# Patient Record
Sex: FEMALE | Race: WHITE | URBAN - METROPOLITAN AREA
[De-identification: names, ages, dates, MRNs, and addresses within clinical notes are randomized per-mention and may not be internally consistent; named-entity substitution may affect disease eponyms.]

---

## 2024-09-10 ENCOUNTER — OFFICE VISIT (OUTPATIENT)
Dept: URGENT CARE | Age: 44
End: 2024-09-10
Payer: COMMERCIAL

## 2024-09-10 VITALS
HEART RATE: 71 BPM | SYSTOLIC BLOOD PRESSURE: 126 MMHG | BODY MASS INDEX: 25.13 KG/M2 | RESPIRATION RATE: 17 BRPM | WEIGHT: 128 LBS | OXYGEN SATURATION: 98 % | DIASTOLIC BLOOD PRESSURE: 84 MMHG | HEIGHT: 60 IN | TEMPERATURE: 98.1 F

## 2024-09-10 DIAGNOSIS — J20.9 ACUTE BRONCHITIS, UNSPECIFIED ORGANISM: Primary | ICD-10-CM

## 2024-09-10 PROBLEM — G35 MULTIPLE SCLEROSIS (MULTI): Status: ACTIVE | Noted: 2024-09-10

## 2024-09-10 RX ORDER — PREDNISONE 20 MG/1
40 TABLET ORAL DAILY
Qty: 10 TABLET | Refills: 0 | Status: SHIPPED | OUTPATIENT
Start: 2024-09-10 | End: 2024-09-15

## 2024-09-10 RX ORDER — ALBUTEROL SULFATE 90 UG/1
2 INHALANT RESPIRATORY (INHALATION) EVERY 6 HOURS PRN
Qty: 18 G | Refills: 0 | Status: SHIPPED | OUTPATIENT
Start: 2024-09-10 | End: 2025-09-10

## 2024-09-10 RX ORDER — CLONAZEPAM 0.5 MG/1
0.5 TABLET ORAL AS NEEDED
COMMUNITY

## 2024-09-10 NOTE — PATIENT INSTRUCTIONS
Please follow up with your primary provider within one week if symptoms do not improve.  You may schedule an appointment online at [email or web address] or call [phone]. Go to the Emergency Department if symptoms significantly worsen or if you develop chest pain or shortness of breath.    Your chest xray did not show signs of bacterial infection. Will call if final report shows otherwise.

## 2024-09-10 NOTE — PROGRESS NOTES
Subjective   Patient ID: Monika Rodriguez is a 43 y.o. female. They present today with a chief complaint of URI (URI symptoms & ear pain for 1 week. No fevers.).    History of Present Illness  Reports predominately dry cough, chest congestion, mild SOB worsening x1 week. Had been on Ocrevis for MS which caused increased URI symptoms. States she would take Augmentin, prednisone, and an inhaler for these symptoms in the past (most recently 3 months ago) with improvement. States she got those medications every other month in the winter. Denies CP, fever, runny nose, rash, leg swelling.       URI      Past Medical History  Allergies as of 09/10/2024 - Reviewed 09/10/2024   Allergen Reaction Noted    Nitrofurantoin monohyd/m-cryst Rash and Shortness of breath 02/28/2017    Polyurethane-39 Shortness of breath 01/02/2024       (Not in a hospital admission)       No past medical history on file.    No past surgical history on file.         Review of Systems  Pertinent systems reviewed and were negative unless otherwise stated in HPI.    Objective    Vitals:    09/10/24 1646   BP: 126/84   BP Location: Left arm   Patient Position: Sitting   BP Cuff Size: Small adult   Pulse: 71   Resp: 17   Temp: 36.7 °C (98.1 °F)   TempSrc: Oral   SpO2: 98%   Weight: 58.1 kg (128 lb)   Height: 1.524 m (5')     No LMP recorded (lmp unknown).    Physical Exam  Constitutional:       General: She is not in acute distress.  HENT:      Right Ear: Tympanic membrane and ear canal normal.      Left Ear: Tympanic membrane and ear canal normal.      Mouth/Throat:      Mouth: Mucous membranes are moist.      Pharynx: Oropharynx is clear.   Eyes:      Conjunctiva/sclera: Conjunctivae normal.   Cardiovascular:      Rate and Rhythm: Normal rate and regular rhythm.   Pulmonary:      Effort: Pulmonary effort is normal.      Breath sounds: Normal breath sounds.   Lymphadenopathy:      Cervical: No cervical adenopathy.   Skin:     Findings: No rash.    Neurological:      Mental Status: She is oriented to person, place, and time.   Psychiatric:         Mood and Affect: Mood normal.       Diagnostic study results (if any) were reviewed by Richard Bravo.    Assessment/Plan   Allergies, medications, history, and pertinent labs/EKGs/imaging reviewed by Richard Bravo.     Medical Decision Making  Given recurrent chest congestion, cough, recurrent antibiotic use, CXR performed to evaluate for noninfectious causes. Denies having CXR in the past. Time spent discussing role of antibiotics and how they likely would not be beneficial currently.     === 09/10/24 ===    XR CHEST 2 VIEWS    - Impression -  No acute cardiopulmonary process.      MACRO:  None.    Signed by: Tad Mcallister 9/10/2024 5:45 PM  Dictation workstation:   [id number]    Orders and Diagnoses  Diagnoses and all orders for this visit:  Acute bronchitis, unspecified organism  -     XR chest 2 views    Disposition: Home    Electronically signed by Richard Bravo

## 2024-09-24 ENCOUNTER — OFFICE VISIT (OUTPATIENT)
Dept: URGENT CARE | Age: 44
End: 2024-09-24
Payer: COMMERCIAL

## 2024-09-24 VITALS
WEIGHT: 130 LBS | SYSTOLIC BLOOD PRESSURE: 144 MMHG | HEART RATE: 69 BPM | BODY MASS INDEX: 25.52 KG/M2 | RESPIRATION RATE: 17 BRPM | TEMPERATURE: 97.9 F | OXYGEN SATURATION: 98 % | HEIGHT: 60 IN | DIASTOLIC BLOOD PRESSURE: 85 MMHG

## 2024-09-24 DIAGNOSIS — J20.9 ACUTE BRONCHITIS, UNSPECIFIED ORGANISM: Primary | ICD-10-CM

## 2024-09-24 RX ORDER — AMOXICILLIN AND CLAVULANATE POTASSIUM 875; 125 MG/1; MG/1
1 TABLET, FILM COATED ORAL 2 TIMES DAILY
Qty: 14 TABLET | Refills: 0 | Status: SHIPPED | OUTPATIENT
Start: 2024-09-24 | End: 2024-10-01

## 2024-09-24 NOTE — PATIENT INSTRUCTIONS
Please follow up with your primary provider within one week if symptoms do not improve.  You may schedule an appointment online at [email or web address] or call [phone]. Go to the Emergency Department if symptoms significantly worsen or if you develop chest pain or shortness of breath.

## 2024-09-24 NOTE — PROGRESS NOTES
Subjective   Patient ID: Monika Rodriguez is a 43 y.o. female. They present today with a chief complaint of Illness (Still not feeling well from previous visit on 9/10.).    History of Present Illness  Reports worsening chest congestion, predominately dry cough which has changed from clear to yellow mucus since last seen here 2 weeks ago when she was diagnosed with bronchitis by me. Took prednisone as directed with minimal improvement. Denies CP, SOB. Tried Mucinex with minimal relief. Has h/o MS and is on an infusion that lowers her ability to fight infection.       Illness      Past Medical History  Allergies as of 09/24/2024 - Reviewed 09/24/2024   Allergen Reaction Noted    Nitrofurantoin monohyd/m-cryst Rash and Shortness of breath 02/28/2017    Polyurethane-39 Shortness of breath 01/02/2024       (Not in a hospital admission)       No past medical history on file.    No past surgical history on file.     reports that she has been smoking cigarettes. She does not have any smokeless tobacco history on file.    Review of Systems  Pertinent systems reviewed and were negative unless otherwise stated in HPI.    Objective    Vitals:    09/24/24 1611   BP: 144/85   BP Location: Left arm   Patient Position: Sitting   BP Cuff Size: Small adult   Pulse: 69   Resp: 17   Temp: 36.6 °C (97.9 °F)   TempSrc: Oral   SpO2: 98%   Weight: 59 kg (130 lb)   Height: 1.524 m (5')     No LMP recorded (lmp unknown). Patient has had an ablation.    Physical Exam  Constitutional:       General: She is not in acute distress.  HENT:      Right Ear: Tympanic membrane, ear canal and external ear normal.      Left Ear: Tympanic membrane, ear canal and external ear normal.      Nose: No congestion.      Mouth/Throat:      Mouth: Mucous membranes are moist.      Pharynx: No oropharyngeal exudate or posterior oropharyngeal erythema.   Eyes:      Conjunctiva/sclera: Conjunctivae normal.   Cardiovascular:      Rate and Rhythm: Normal rate and  regular rhythm.   Pulmonary:      Effort: Pulmonary effort is normal. No respiratory distress.      Breath sounds: No wheezing.      Comments: Coarse breath sound that clear with cough  Skin:     Findings: No rash.         Diagnostic study results (if any) were reviewed by Richard Bravo PA-C.    Assessment/Plan   Allergies, medications, history, and pertinent labs/EKGs/imaging reviewed by Richard Bravo PA-C.     Medical Decision Making  Low concern for PE. States she has had similar symptoms in past that have lasted this long with improvement after Augmentin. Appears similar today from 2 weeks ago, no significant respiratory distress. Advised nasal saline, humidified air.    Orders and Diagnoses  Diagnoses and all orders for this visit:  Acute bronchitis, unspecified organism      Medical Admin Record      Disposition: Home    Electronically signed by Richard Bravo PA-C

## 2024-11-26 ENCOUNTER — OFFICE VISIT (OUTPATIENT)
Dept: URGENT CARE | Age: 44
End: 2024-11-26
Payer: COMMERCIAL

## 2024-11-26 VITALS
HEIGHT: 60 IN | SYSTOLIC BLOOD PRESSURE: 139 MMHG | DIASTOLIC BLOOD PRESSURE: 86 MMHG | RESPIRATION RATE: 18 BRPM | BODY MASS INDEX: 25.52 KG/M2 | OXYGEN SATURATION: 99 % | HEART RATE: 67 BPM | TEMPERATURE: 98.2 F | WEIGHT: 130 LBS

## 2024-11-26 DIAGNOSIS — J41.1 BRONCHITIS, MUCOPURULENT RECURRENT (MULTI): Primary | ICD-10-CM

## 2024-11-26 DIAGNOSIS — Z72.0 TOBACCO USE: ICD-10-CM

## 2024-11-26 RX ORDER — PREDNISONE 20 MG/1
40 TABLET ORAL DAILY
Qty: 10 TABLET | Refills: 0 | Status: SHIPPED | OUTPATIENT
Start: 2024-11-26 | End: 2024-12-01

## 2024-11-26 RX ORDER — BENZONATATE 200 MG/1
200 CAPSULE ORAL 3 TIMES DAILY PRN
Qty: 30 CAPSULE | Refills: 0 | Status: SHIPPED | OUTPATIENT
Start: 2024-11-26 | End: 2024-12-06

## 2024-11-26 RX ORDER — AMOXICILLIN AND CLAVULANATE POTASSIUM 875; 125 MG/1; MG/1
1 TABLET, FILM COATED ORAL 2 TIMES DAILY
Qty: 14 TABLET | Refills: 0 | Status: SHIPPED | OUTPATIENT
Start: 2024-11-26 | End: 2024-12-03

## 2024-11-26 RX ORDER — ALBUTEROL SULFATE 90 UG/1
2 INHALANT RESPIRATORY (INHALATION) EVERY 4 HOURS PRN
Qty: 6.7 G | Refills: 0 | Status: SHIPPED | OUTPATIENT
Start: 2024-11-26 | End: 2025-11-26

## 2024-11-26 RX ORDER — IBUPROFEN 200 MG
TABLET ORAL
Qty: 42 PATCH | Refills: 0 | Status: SHIPPED | OUTPATIENT
Start: 2024-11-26

## 2024-11-26 ASSESSMENT — ENCOUNTER SYMPTOMS
SHORTNESS OF BREATH: 1
WHEEZING: 1
COUGH: 1

## 2024-11-26 NOTE — PROGRESS NOTES
Subjective   Patient ID: Mnoika Rodriguez is a 44 y.o. female. They present today with a chief complaint of Cough (Coughing up mucus SOB 5 days).    History of Present Illness  Pt presents with cough x 5 d. It is productive with green and yellow thick sputum. Associated chest congestion, feels like she cannot get a deep breath in and wheezing. Hx of recurrent episodes of bronchitis. Last was in September. Had CXR done at that time that was unremarkable. She thought she was having recurrent bronchitis given hx of MS and infusions of Ocrevus but she not been on this medication since March. She is established with [facility] pulmonology, was dx with cough variant asthma. She does not use any maintenance inhalers. She denies known sick contacts. She continues to smoke cigarettes, states she smokes about 2-3 cigarettes per day during the week and increased amount on weekends when going out. She is taking advil for her symptoms. Denies cp, fever, chills, back pain, dizziness, nasal congestion, sore throat, runny nose, leg swelling, syncope, hemoptysis.       History provided by:  Patient  Cough  Associated symptoms include shortness of breath and wheezing.       Past Medical History  Allergies as of 11/26/2024 - Reviewed 11/26/2024   Allergen Reaction Noted    Nitrofurantoin monohyd/m-cryst Rash and Shortness of breath 02/28/2017    Polyurethane-39 Shortness of breath 01/02/2024       (Not in a hospital admission)       No past medical history on file.    No past surgical history on file.     reports that she has been smoking cigarettes. She does not have any smokeless tobacco history on file.    Review of Systems  Review of Systems   Respiratory:  Positive for cough, shortness of breath and wheezing.    All other systems reviewed and are negative.                                 Objective    Vitals:    11/26/24 1331   BP: 139/86   BP Location: Right arm   Patient Position: Sitting   BP Cuff Size: Small adult   Pulse: 67    Resp: 18   Temp: 36.8 °C (98.2 °F)   TempSrc: Oral   SpO2: 99%   Weight: 59 kg (130 lb)   Height: 1.524 m (5')     No LMP recorded. Patient has had an ablation.    Physical Exam  Vitals and nursing note reviewed.   Constitutional:       General: She is not in acute distress.     Appearance: Normal appearance. She is not ill-appearing, toxic-appearing or diaphoretic.   HENT:      Head: Normocephalic and atraumatic.      Right Ear: Tympanic membrane, ear canal and external ear normal.      Left Ear: Tympanic membrane, ear canal and external ear normal.      Mouth/Throat:      Mouth: Mucous membranes are moist.   Cardiovascular:      Rate and Rhythm: Normal rate and regular rhythm.      Pulses: Normal pulses.      Heart sounds: No murmur heard.  Pulmonary:      Effort: Pulmonary effort is normal. No respiratory distress.      Breath sounds: Wheezing (mild, diffuse, end expiratory) present. No rhonchi or rales.   Musculoskeletal:      Right lower leg: No edema.      Left lower leg: No edema.   Skin:     Capillary Refill: Capillary refill takes less than 2 seconds.      Findings: No rash.   Neurological:      General: No focal deficit present.      Mental Status: She is alert.         Procedures    Point of Care Test & Imaging Results from this visit  No results found for this visit on 11/26/24.   No results found.    Diagnostic study results (if any) were reviewed by Kamilah Medina PA-C.    Assessment/Plan   Allergies, medications, history, and pertinent labs/EKGs/Imaging reviewed by Kamilah Medina PA-C.     Medical Decision Making  Low concern for pneumonia, ACS, CHF, cardiomyopathy or PE at this time    Discussed smoking cessation and how this is likely causing her chronic, recurrent episode of bronchitis. She is interested in being prescribed nicotine patches. She is not sure that she is ready to quit smoking now but states if she has the prescription in front of her and sees it daily it may prompt her to try  them eventually. She has never tried to quit smoking in the past.     Pt wishes to have a second pulmonary opinion therefore a referral was placed today     Advised follow up with pcp in 1 week    May return at any time as needed    Strict ED precautions discussed         Orders and Diagnoses  Diagnoses and all orders for this visit:  Bronchitis, mucopurulent recurrent (Multi)  -     amoxicillin-pot clavulanate (Augmentin) 875-125 mg tablet; Take 1 tablet by mouth 2 times a day for 7 days.  -     predniSONE (Deltasone) 20 mg tablet; Take 2 tablets (40 mg) by mouth once daily for 5 days.  -     benzonatate (Tessalon) 200 mg capsule; Take 1 capsule (200 mg) by mouth 3 times a day as needed for cough for up to 10 days. Do not crush or chew.  -     albuterol (Proventil HFA) 90 mcg/actuation inhaler; Inhale 2 puffs every 4 hours if needed for wheezing or shortness of breath.  -     Referral to Pulmonology; Future  Tobacco use  -     amoxicillin-pot clavulanate (Augmentin) 875-125 mg tablet; Take 1 tablet by mouth 2 times a day for 7 days.  -     predniSONE (Deltasone) 20 mg tablet; Take 2 tablets (40 mg) by mouth once daily for 5 days.  -     benzonatate (Tessalon) 200 mg capsule; Take 1 capsule (200 mg) by mouth 3 times a day as needed for cough for up to 10 days. Do not crush or chew.  -     albuterol (Proventil HFA) 90 mcg/actuation inhaler; Inhale 2 puffs every 4 hours if needed for wheezing or shortness of breath.  -     Referral to Pulmonology; Future  -     nicotine (Nicoderm CQ) 14 mg/24 hr patch; Place one patch on clean and dry skin once every 24 hours. Do this for 6 weeks      Medical Admin Record      Patient disposition: Home    Electronically signed by Kamilah Medina PA-C  1:58 PM

## 2024-11-26 NOTE — PATIENT INSTRUCTIONS
Please follow up with your primary provider within one week if symptoms do not improve.  You may schedule an appointment online at [email or web address] or call [phone]. Go to the Emergency Department if symptoms significantly worsen or if you develop symptoms including but not limited to chest pain or shortness of breath.    Add probiotic